# Patient Record
Sex: MALE | Race: WHITE | NOT HISPANIC OR LATINO | URBAN - METROPOLITAN AREA
[De-identification: names, ages, dates, MRNs, and addresses within clinical notes are randomized per-mention and may not be internally consistent; named-entity substitution may affect disease eponyms.]

---

## 2018-04-30 ENCOUNTER — OFFICE VISIT - HEALTHEAST (OUTPATIENT)
Dept: INTERNAL MEDICINE | Facility: CLINIC | Age: 33
End: 2018-04-30

## 2018-04-30 ENCOUNTER — COMMUNICATION - HEALTHEAST (OUTPATIENT)
Dept: TELEHEALTH | Facility: CLINIC | Age: 33
End: 2018-04-30

## 2018-04-30 DIAGNOSIS — N45.1 EPIDIDYMITIS: ICD-10-CM

## 2018-04-30 DIAGNOSIS — F43.23 ADJUSTMENT REACTION WITH ANXIETY AND DEPRESSION: ICD-10-CM

## 2018-04-30 DIAGNOSIS — R10.31 RIGHT INGUINAL PAIN: ICD-10-CM

## 2018-04-30 DIAGNOSIS — R10.31 CHRONIC GROIN PAIN, RIGHT: ICD-10-CM

## 2018-04-30 DIAGNOSIS — Z00.00 ENCOUNTER FOR GENERAL HEALTH EXAMINATION: ICD-10-CM

## 2018-04-30 DIAGNOSIS — G89.29 CHRONIC GROIN PAIN, RIGHT: ICD-10-CM

## 2018-04-30 LAB
ALBUMIN SERPL-MCNC: 4.3 G/DL (ref 3.5–5)
ALBUMIN UR-MCNC: NEGATIVE MG/DL
ALP SERPL-CCNC: 80 U/L (ref 45–120)
ALT SERPL W P-5'-P-CCNC: 18 U/L (ref 0–45)
ANION GAP SERPL CALCULATED.3IONS-SCNC: 8 MMOL/L (ref 5–18)
APPEARANCE UR: CLEAR
AST SERPL W P-5'-P-CCNC: 26 U/L (ref 0–40)
BACTERIA #/AREA URNS HPF: ABNORMAL HPF
BASOPHILS # BLD AUTO: 0 THOU/UL (ref 0–0.2)
BASOPHILS NFR BLD AUTO: 0 % (ref 0–2)
BILIRUB SERPL-MCNC: 0.9 MG/DL (ref 0–1)
BILIRUB UR QL STRIP: NEGATIVE
BUN SERPL-MCNC: 8 MG/DL (ref 8–22)
CALCIUM SERPL-MCNC: 9.5 MG/DL (ref 8.5–10.5)
CHLORIDE BLD-SCNC: 104 MMOL/L (ref 98–107)
CO2 SERPL-SCNC: 28 MMOL/L (ref 22–31)
COLOR UR AUTO: YELLOW
CREAT SERPL-MCNC: 0.8 MG/DL (ref 0.7–1.3)
EOSINOPHIL # BLD AUTO: 0.2 THOU/UL (ref 0–0.4)
EOSINOPHIL NFR BLD AUTO: 4 % (ref 0–6)
ERYTHROCYTE [DISTWIDTH] IN BLOOD BY AUTOMATED COUNT: 11.8 % (ref 11–14.5)
GFR SERPL CREATININE-BSD FRML MDRD: >60 ML/MIN/1.73M2
GLUCOSE BLD-MCNC: 82 MG/DL (ref 70–125)
GLUCOSE UR STRIP-MCNC: NEGATIVE MG/DL
HCT VFR BLD AUTO: 47 % (ref 40–54)
HGB BLD-MCNC: 16.3 G/DL (ref 14–18)
HGB UR QL STRIP: ABNORMAL
KETONES UR STRIP-MCNC: NEGATIVE MG/DL
LEUKOCYTE ESTERASE UR QL STRIP: NEGATIVE
LYMPHOCYTES # BLD AUTO: 1.9 THOU/UL (ref 0.8–4.4)
LYMPHOCYTES NFR BLD AUTO: 39 % (ref 20–40)
MCH RBC QN AUTO: 29.4 PG (ref 27–34)
MCHC RBC AUTO-ENTMCNC: 34.8 G/DL (ref 32–36)
MCV RBC AUTO: 84 FL (ref 80–100)
MONOCYTES # BLD AUTO: 0.3 THOU/UL (ref 0–0.9)
MONOCYTES NFR BLD AUTO: 6 % (ref 2–10)
NEUTROPHILS # BLD AUTO: 2.6 THOU/UL (ref 2–7.7)
NEUTROPHILS NFR BLD AUTO: 52 % (ref 50–70)
NITRATE UR QL: NEGATIVE
PH UR STRIP: 6.5 [PH] (ref 5–8)
PLATELET # BLD AUTO: 154 THOU/UL (ref 140–440)
PMV BLD AUTO: 7.9 FL (ref 7–10)
POTASSIUM BLD-SCNC: 4.3 MMOL/L (ref 3.5–5)
PROT SERPL-MCNC: 8 G/DL (ref 6–8)
RBC # BLD AUTO: 5.56 MILL/UL (ref 4.4–6.2)
RBC #/AREA URNS AUTO: ABNORMAL HPF
SODIUM SERPL-SCNC: 140 MMOL/L (ref 136–145)
SP GR UR STRIP: 1.01 (ref 1–1.03)
SQUAMOUS #/AREA URNS AUTO: ABNORMAL LPF
UROBILINOGEN UR STRIP-ACNC: ABNORMAL
WBC #/AREA URNS AUTO: ABNORMAL HPF
WBC: 5 THOU/UL (ref 4–11)

## 2018-04-30 ASSESSMENT — MIFFLIN-ST. JEOR: SCORE: 1704.09

## 2018-05-02 ENCOUNTER — COMMUNICATION - HEALTHEAST (OUTPATIENT)
Dept: INTERNAL MEDICINE | Facility: CLINIC | Age: 33
End: 2018-05-02

## 2018-05-08 ENCOUNTER — RECORDS - HEALTHEAST (OUTPATIENT)
Dept: ADMINISTRATIVE | Facility: OTHER | Age: 33
End: 2018-05-08

## 2018-05-15 ENCOUNTER — RECORDS - HEALTHEAST (OUTPATIENT)
Dept: ADMINISTRATIVE | Facility: OTHER | Age: 33
End: 2018-05-15

## 2018-05-17 ENCOUNTER — RECORDS - HEALTHEAST (OUTPATIENT)
Dept: ADMINISTRATIVE | Facility: OTHER | Age: 33
End: 2018-05-17

## 2018-05-18 ENCOUNTER — COMMUNICATION - HEALTHEAST (OUTPATIENT)
Dept: INTERNAL MEDICINE | Facility: CLINIC | Age: 33
End: 2018-05-18

## 2018-06-14 ENCOUNTER — OFFICE VISIT - HEALTHEAST (OUTPATIENT)
Dept: INTERNAL MEDICINE | Facility: CLINIC | Age: 33
End: 2018-06-14

## 2018-06-14 DIAGNOSIS — H72.90 PERFORATED EAR DRUM: ICD-10-CM

## 2018-06-14 DIAGNOSIS — F43.23 ADJUSTMENT REACTION WITH ANXIETY AND DEPRESSION: ICD-10-CM

## 2019-05-16 ENCOUNTER — COMMUNICATION - HEALTHEAST (OUTPATIENT)
Dept: INTERNAL MEDICINE | Facility: CLINIC | Age: 34
End: 2019-05-16

## 2019-06-10 ENCOUNTER — OFFICE VISIT - HEALTHEAST (OUTPATIENT)
Dept: INTERNAL MEDICINE | Facility: CLINIC | Age: 34
End: 2019-06-10

## 2019-06-10 DIAGNOSIS — F41.9 ANXIETY: ICD-10-CM

## 2019-06-10 DIAGNOSIS — Z00.00 ROUTINE GENERAL MEDICAL EXAMINATION AT A HEALTH CARE FACILITY: ICD-10-CM

## 2019-06-10 DIAGNOSIS — J45.990 EXERCISE-INDUCED ASTHMA: ICD-10-CM

## 2019-06-10 LAB
ALBUMIN SERPL-MCNC: 4.1 G/DL (ref 3.5–5)
ALP SERPL-CCNC: 63 U/L (ref 45–120)
ALT SERPL W P-5'-P-CCNC: 21 U/L (ref 0–45)
ANION GAP SERPL CALCULATED.3IONS-SCNC: 8 MMOL/L (ref 5–18)
AST SERPL W P-5'-P-CCNC: 24 U/L (ref 0–40)
BASOPHILS # BLD AUTO: 0 THOU/UL (ref 0–0.2)
BASOPHILS NFR BLD AUTO: 0 % (ref 0–2)
BILIRUB SERPL-MCNC: 0.5 MG/DL (ref 0–1)
BUN SERPL-MCNC: 9 MG/DL (ref 8–22)
CALCIUM SERPL-MCNC: 9.6 MG/DL (ref 8.5–10.5)
CHLORIDE BLD-SCNC: 106 MMOL/L (ref 98–107)
CO2 SERPL-SCNC: 25 MMOL/L (ref 22–31)
CREAT SERPL-MCNC: 0.77 MG/DL (ref 0.7–1.3)
EOSINOPHIL # BLD AUTO: 0.3 THOU/UL (ref 0–0.4)
EOSINOPHIL NFR BLD AUTO: 6 % (ref 0–6)
ERYTHROCYTE [DISTWIDTH] IN BLOOD BY AUTOMATED COUNT: 11.6 % (ref 11–14.5)
GFR SERPL CREATININE-BSD FRML MDRD: >60 ML/MIN/1.73M2
GLUCOSE BLD-MCNC: 89 MG/DL (ref 70–125)
HCT VFR BLD AUTO: 44.4 % (ref 40–54)
HGB BLD-MCNC: 15.1 G/DL (ref 14–18)
LYMPHOCYTES # BLD AUTO: 1.8 THOU/UL (ref 0.8–4.4)
LYMPHOCYTES NFR BLD AUTO: 34 % (ref 20–40)
MCH RBC QN AUTO: 29 PG (ref 27–34)
MCHC RBC AUTO-ENTMCNC: 33.9 G/DL (ref 32–36)
MCV RBC AUTO: 86 FL (ref 80–100)
MONOCYTES # BLD AUTO: 0.3 THOU/UL (ref 0–0.9)
MONOCYTES NFR BLD AUTO: 6 % (ref 2–10)
NEUTROPHILS # BLD AUTO: 2.9 THOU/UL (ref 2–7.7)
NEUTROPHILS NFR BLD AUTO: 55 % (ref 50–70)
PLATELET # BLD AUTO: 164 THOU/UL (ref 140–440)
PMV BLD AUTO: 7.4 FL (ref 7–10)
POTASSIUM BLD-SCNC: 4.2 MMOL/L (ref 3.5–5)
PROT SERPL-MCNC: 7.1 G/DL (ref 6–8)
RBC # BLD AUTO: 5.18 MILL/UL (ref 4.4–6.2)
SODIUM SERPL-SCNC: 139 MMOL/L (ref 136–145)
WBC: 5.3 THOU/UL (ref 4–11)

## 2019-06-10 RX ORDER — ALBUTEROL SULFATE 90 UG/1
2 AEROSOL, METERED RESPIRATORY (INHALATION) EVERY 6 HOURS PRN
Qty: 1 EACH | Refills: 12 | Status: SHIPPED | OUTPATIENT
Start: 2019-06-10

## 2019-06-10 ASSESSMENT — MIFFLIN-ST. JEOR: SCORE: 1732.78

## 2019-06-11 LAB
HEPATITIS B SURFACE ANTIBODY LHE- HISTORICAL: POSITIVE
RUBV IGG SERPL QL IA: POSITIVE

## 2019-06-12 ENCOUNTER — COMMUNICATION - HEALTHEAST (OUTPATIENT)
Dept: INTERNAL MEDICINE | Facility: CLINIC | Age: 34
End: 2019-06-12

## 2019-06-12 LAB
MEV IGG SER IA-ACNC: POSITIVE
MUV IGG SER QL IA: POSITIVE
VZV IGG SER QL IA: POSITIVE

## 2019-07-05 ENCOUNTER — COMMUNICATION - HEALTHEAST (OUTPATIENT)
Dept: INTERNAL MEDICINE | Facility: CLINIC | Age: 34
End: 2019-07-05

## 2019-08-06 ENCOUNTER — COMMUNICATION - HEALTHEAST (OUTPATIENT)
Dept: INTERNAL MEDICINE | Facility: CLINIC | Age: 34
End: 2019-08-06

## 2019-08-08 ENCOUNTER — COMMUNICATION - HEALTHEAST (OUTPATIENT)
Dept: INTERNAL MEDICINE | Facility: CLINIC | Age: 34
End: 2019-08-08

## 2019-09-04 ENCOUNTER — COMMUNICATION - HEALTHEAST (OUTPATIENT)
Dept: INTERNAL MEDICINE | Facility: CLINIC | Age: 34
End: 2019-09-04

## 2019-09-19 ENCOUNTER — COMMUNICATION - HEALTHEAST (OUTPATIENT)
Dept: INTERNAL MEDICINE | Facility: CLINIC | Age: 34
End: 2019-09-19

## 2019-11-19 ENCOUNTER — COMMUNICATION - HEALTHEAST (OUTPATIENT)
Dept: INTERNAL MEDICINE | Facility: CLINIC | Age: 34
End: 2019-11-19

## 2019-12-17 ENCOUNTER — COMMUNICATION - HEALTHEAST (OUTPATIENT)
Dept: INTERNAL MEDICINE | Facility: CLINIC | Age: 34
End: 2019-12-17

## 2020-01-22 ENCOUNTER — COMMUNICATION - HEALTHEAST (OUTPATIENT)
Dept: INTERNAL MEDICINE | Facility: CLINIC | Age: 35
End: 2020-01-22

## 2020-03-24 ENCOUNTER — COMMUNICATION - HEALTHEAST (OUTPATIENT)
Dept: INTERNAL MEDICINE | Facility: CLINIC | Age: 35
End: 2020-03-24

## 2020-10-16 ENCOUNTER — COMMUNICATION - HEALTHEAST (OUTPATIENT)
Dept: INTERNAL MEDICINE | Facility: CLINIC | Age: 35
End: 2020-10-16

## 2020-10-16 DIAGNOSIS — F41.9 ANXIETY: ICD-10-CM

## 2020-10-18 RX ORDER — ESCITALOPRAM OXALATE 20 MG/1
20 TABLET ORAL DAILY
Qty: 90 TABLET | Refills: 3 | Status: SHIPPED | OUTPATIENT
Start: 2020-10-18

## 2021-02-12 ENCOUNTER — COMMUNICATION - HEALTHEAST (OUTPATIENT)
Dept: INTERNAL MEDICINE | Facility: CLINIC | Age: 36
End: 2021-02-12

## 2021-02-12 DIAGNOSIS — Z11.1 ENCOUNTER FOR TUBERCULIN SKIN TEST: ICD-10-CM

## 2021-02-15 ENCOUNTER — AMBULATORY - HEALTHEAST (OUTPATIENT)
Dept: NURSING | Facility: CLINIC | Age: 36
End: 2021-02-15

## 2021-02-15 DIAGNOSIS — Z11.1 ENCOUNTER FOR TUBERCULIN SKIN TEST: ICD-10-CM

## 2021-02-17 ENCOUNTER — COMMUNICATION - HEALTHEAST (OUTPATIENT)
Dept: FAMILY MEDICINE | Facility: CLINIC | Age: 36
End: 2021-02-17

## 2021-02-17 ENCOUNTER — AMBULATORY - HEALTHEAST (OUTPATIENT)
Dept: NURSING | Facility: CLINIC | Age: 36
End: 2021-02-17

## 2021-02-17 DIAGNOSIS — Z11.1 ENCOUNTER FOR TUBERCULIN SKIN TEST: ICD-10-CM

## 2021-02-17 DIAGNOSIS — Z11.1 ENCOUNTER FOR READING OF TUBERCULIN SKIN TEST: ICD-10-CM

## 2021-02-17 LAB — TB SKIN TEST - HISTORICAL: NEGATIVE

## 2021-02-23 ENCOUNTER — AMBULATORY - HEALTHEAST (OUTPATIENT)
Dept: NURSING | Facility: CLINIC | Age: 36
End: 2021-02-23

## 2021-02-23 DIAGNOSIS — Z11.1 ENCOUNTER FOR TUBERCULIN SKIN TEST: ICD-10-CM

## 2021-02-25 ENCOUNTER — AMBULATORY - HEALTHEAST (OUTPATIENT)
Dept: NURSING | Facility: CLINIC | Age: 36
End: 2021-02-25

## 2021-02-25 DIAGNOSIS — Z11.1 ENCOUNTER FOR READING OF TUBERCULIN SKIN TEST: ICD-10-CM

## 2021-02-25 LAB
INDURATION - HISTORICAL: 0 MM
TB SKIN TEST - HISTORICAL: NEGATIVE

## 2021-05-28 ASSESSMENT — ASTHMA QUESTIONNAIRES: ACT_TOTALSCORE: 20

## 2021-05-29 NOTE — PATIENT INSTRUCTIONS - HE
1. Continue escitalopram    2. Use albuterol inhaler prn for exercise induced asthma    3. Lab testing today.

## 2021-05-29 NOTE — PROGRESS NOTES
ASSESSMENT/PLAN:  1. Routine general medical examination at a health care facility  He needs confirmation of immunity for school this fall.  May need hep B immunization.   - Rubella Antibody, IgG  - Rubeola Antibody, IgG  - Mumps Antibody, IgG  - Varicella Zoster Antibody, IgG  - Hepatitis B Surface Antibody (Anti-HBs)  - HM1(CBC and Differential)  - Comprehensive Metabolic Panel    2. Exercise-induced asthma  Mild, can use albuterol inhaler prn.   - albuterol (PROAIR HFA;PROVENTIL HFA;VENTOLIN HFA) 90 mcg/actuation inhaler; Inhale 2 puffs every 6 (six) hours as needed for wheezing (and with exercise).  Dispense: 1 each; Refill: 12    3. Adjustment reaction with anxiety and depression  Much improved with escitalopram.  Willl continue.   - escitalopram oxalate (LEXAPRO) 20 MG tablet; Take 1 tablet (20 mg total) by mouth daily.  Dispense: 30 tablet; Refill: 11    Patient Instructions   1. Continue escitalopram    2. Use albuterol inhaler prn for exercise induced asthma    3. Lab testing today.        CHIEF COMPLAINT:  Chief Complaint   Patient presents with     Annual Exam     Paperwork     school paperwork-- [employer] for school      Other     would like a prescription for an inhaler      HISTORY OF PRESENT ILLNESS:  Isai is a 33 y.o. male presenting to the clinic today for general health evaluation.  Has been doing well with escitalopram.  No significant side effects.  Planning to attend school in the fall.  Needs immunization confirmation. Tolerating exercise well.     REVIEW OF SYSTEMS:   Constitutional: no fever, chills, or sweats  Respiratory: No wheezes, cough, shortness of breath  Cardiovascular: No chest pain or palpitations  Gastrointestinal: No nausea, vomiting, diarrhea, dyspepsia, or pain  Psychiatric: see HPI   All other systems on reveiw are negative.    PFSH:  Social History     Tobacco Use   Smoking Status Never Smoker   Smokeless Tobacco Never Used     Family History   Problem Relation Age of Onset      "Testicular cancer Father      COPD Father      Throat cancer Maternal Grandfather      Social History     Socioeconomic History     Marital status:      Spouse name: Not on file     Number of children: 0     Years of education: Not on file     Highest education level: Not on file   Occupational History     Occupation: Unemployed   Social Needs     Financial resource strain: Not on file     Food insecurity:     Worry: Not on file     Inability: Not on file     Transportation needs:     Medical: Not on file     Non-medical: Not on file   Tobacco Use     Smoking status: Never Smoker     Smokeless tobacco: Never Used   Substance and Sexual Activity     Alcohol use: Not on file     Drug use: Not on file     Sexual activity: Not on file   Lifestyle     Physical activity:     Days per week: Not on file     Minutes per session: Not on file     Stress: Not on file   Relationships     Social connections:     Talks on phone: Not on file     Gets together: Not on file     Attends Jainism service: Not on file     Active member of club or organization: Not on file     Attends meetings of clubs or organizations: Not on file     Relationship status: Not on file     Intimate partner violence:     Fear of current or ex partner: Not on file     Emotionally abused: Not on file     Physically abused: Not on file     Forced sexual activity: Not on file   Other Topics Concern     Not on file   Social History Narrative    Marketing degree      Past Surgical History:   Procedure Laterality Date     HERNIA REPAIR       No Known Allergies  Past Medical History:   Diagnosis Date     Asthma in adult, mild intermittent, uncomplicated     exercise induced, cold air     Exercise-induced asthma 6/10/2019     Seasonal allergic rhinitis      VITALS:  Vitals:    06/10/19 1518   BP: 110/78   Patient Site: Left Arm   Patient Position: Sitting   Cuff Size: Adult Large   Pulse: 64   Resp: 16   Weight: 185 lb (83.9 kg)   Height: 5' 7\" (1.702 m) " "    Wt Readings from Last 3 Encounters:   06/10/19 185 lb (83.9 kg)   06/14/18 177 lb 5 oz (80.4 kg)   04/30/18 177 lb 12.8 oz (80.6 kg)     Body mass index is 28.98 kg/m .  PHYSICAL EXAM:  General Appearance: In no acute distress  /78 (Patient Site: Left Arm, Patient Position: Sitting, Cuff Size: Adult Large)   Pulse 64   Resp 16   Ht 5' 7\" (1.702 m)   Wt 185 lb (83.9 kg)   BMI 28.98 kg/m    EYES: Clear, without inflammation, fundi are unremarkable, discs flat   HEENT: Without congestion or inflammation  NECK:  without cervical or axillary adenopathy, thyroid normal  RESPIRATORY: Clear to auscultation  CARDIOVASCULAR: S1, S2, without murmur   ABDOMEN: soft, flat, and non-tender, without mass, rebound, or guarding  RECTAL: deferred  GENITOURINARY: normal testes and phallus  EXTREMITIES: No joint swelling, no ulcer or edema  NEUROLOGIC: Non-focal, no arm or leg  weakness, speech is clear, gait is normal  PSYCHIATRIC: Oriented X 3, without confusion, behavior and affect normal, thinking is clear    Current Outpatient Medications   Medication Sig Dispense Refill     escitalopram oxalate (LEXAPRO) 20 MG tablet Take 1 tablet (20 mg total) by mouth daily. 30 tablet 11     albuterol (PROAIR HFA;PROVENTIL HFA;VENTOLIN HFA) 90 mcg/actuation inhaler Inhale 2 puffs every 6 (six) hours as needed for wheezing (and with exercise). 1 each 12     No current facility-administered medications for this visit.      "

## 2021-05-31 NOTE — TELEPHONE ENCOUNTER
2:00: Dr Shaver was in his office and completed the form. Aminata very appreciative.    Pt's wife dropped off form for an ins discount.  Dr Rodriguez needs to sign and mail it to pt's home.    Put in 's in basket in the back of the [place].

## 2021-06-01 VITALS — BODY MASS INDEX: 27.56 KG/M2 | WEIGHT: 177.31 LBS

## 2021-06-01 VITALS — HEIGHT: 67 IN | BODY MASS INDEX: 27.91 KG/M2 | WEIGHT: 177.8 LBS

## 2021-06-03 VITALS — WEIGHT: 185 LBS | BODY MASS INDEX: 29.03 KG/M2 | HEIGHT: 67 IN

## 2021-06-04 NOTE — TELEPHONE ENCOUNTER
LMTCB  CC Please go over questions below with pt          Asthma Control Test    1. In the past 4 weeks, how much of the time did your asthma keep you from getting as much done at work, school or at home?    All of the time (1)  Most of the time (2)  Some of the time (3)  Alittle of the time (4)   Not at all (5)    2. During the past 4 weeks, how often have you had shortness of breath?      More than once a day ( 1)  Once a day (2)  3-6 times a week (3)   Once or twice a week (4)  Not at all (5)    3.During the past 4 weeks, how often did your asthma symptoms (wheezing, coughing, shortness of breath, chest tightness or pain) wake you up at night or earlier then usual in the morning?     4 or more nights a week (1)  2 to 3 nights a week (2)  Once a week (3)  Once or twice (4)  Not at all (5)      4. During the past 4 weeks, how often have you used your rescue inhaler or nebulizer medication(such as albuterol)?    3 or more times per day (1)  1 to 2 times per day (2)  2 or 3 times per week (3)  Once a week or less (4)  Not at all (5)      5. How would you rate your asthma control over the past 4 weeks?    Not controlled at all (1)  Poorly controlled (2)  Somewhat controlled (3)   Well controlled (4)   Completely controlled (5)       -In the past 12 months, How many emergency department visits have you had due to asthma ( That did not result in hospitalization)?    -In the past 12 months, how many inpatients hospitalizations have you had due to asthma?    Have you been using your inhaler? If yes, how often?        Please answer these questions by example below    1. 5  2. 5  3. 5  4. 5  5. 5    No or yes    No or yes

## 2021-06-15 NOTE — PROGRESS NOTES
Isai came in today for a PPD placement, see immunizations.  He will come back on 2/17 after 4:15 to have it read    Kirsten Cartagena CMA (AAMA)

## 2021-06-15 NOTE — TELEPHONE ENCOUNTER
Left message to call back for:  Isai  Information to relay to patient:  Order for mantoux is placed.  Please set up nurse only visit.

## 2021-06-15 NOTE — TELEPHONE ENCOUNTER
Pt needs a two step mantoux completed. He is coming in next week for his second mantoux. Please sign pended orders.

## 2021-06-15 NOTE — TELEPHONE ENCOUNTER
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

## 2021-06-16 PROBLEM — G89.29 CHRONIC GROIN PAIN, RIGHT: Status: ACTIVE | Noted: 2018-04-30

## 2021-06-16 PROBLEM — N45.1 EPIDIDYMITIS: Status: ACTIVE | Noted: 2018-04-30

## 2021-06-16 PROBLEM — F43.23 ADJUSTMENT REACTION WITH ANXIETY AND DEPRESSION: Status: ACTIVE | Noted: 2018-04-30

## 2021-06-16 PROBLEM — J45.990 EXERCISE-INDUCED ASTHMA: Status: ACTIVE | Noted: 2019-06-10

## 2021-06-16 PROBLEM — R10.31 CHRONIC GROIN PAIN, RIGHT: Status: ACTIVE | Noted: 2018-04-30

## 2021-06-17 NOTE — PROGRESS NOTES
ASSESSMENT/PLAN:    1. Epididymitis  Exam on the right, with tenderness, will treat empirically with doxycycline 100mg po bid for 10 days.   - Urinalysis-UC if Indicated  - HM1(CBC and Differential)  - HM1 (CBC with Diff)    2. Encounter for general health examination  His exam is negative for acute or chronic general health issues.  Discussed healthy life style and diet.    - Comprehensive Metabolic Panel    3. Right inguinal pain  Chronic, exam  - Ambulatory referral to General Surgery    4. Adjustment reaction with anxiety and depression   History is consistent with mild depression disorder with anxiety and occasional panic.  No self harm ideation.  He agrees to trial of escitalopram 20mg po daily at , follow up in 6 weeks, referral to psychology for assessment for counseling.   - Ambulatory referral to Psychology    CHIEF COMPLAINT:  Chief Complaint   Patient presents with     [facility]     Annual Exam     HISTORY OF PRESENT ILLNESS:  Isai is a 32 y.o. male presenting to the clinic today with irritability, occasional episode of anxiety and panic. Recently moved to this area.  Unemployed presently. Is .  No children.  Denies excess use of alcohol or any drug use, or cigarette smoking.  Has not been ill or had fever, or chills, or unusual cough, or nausea.  There is a chronic right groin pain, without clear exacerbating factors.  History of herniorrhaphy on that side in child watt.  Also a tenderness right testicle.  No known trauma, or dysuria or discharge.       REVIEW OF SYSTEMS:   Constitutional: no fever, chills, or sweats  Respiratory: No wheezes, cough, shortness of breath  Cardiovascular: No chest pain or palpitations  Gastrointestinal: No nausea, vomiting, diarrhea, dyspepsia, or pain  Genitourinary: see HPI  All other systems on reveiw are negative.  PFSH:    History   Smoking Status     Never Smoker   Smokeless Tobacco     Never Used     Family History   Problem Relation Age of Onset      "Testicular cancer Father      COPD Father      Throat cancer Maternal Grandfather      Social History     Social History     Marital status:      Spouse name: N/A     Number of children: 0     Years of education: N/A     Occupational History     Unemployed      Social History Main Topics     Smoking status: Never Smoker     Smokeless tobacco: Never Used     Alcohol use Not on file     Drug use: Not on file     Sexual activity: Not on file     Other Topics Concern     Not on file     Social History Narrative    Marketing degree        Past Surgical History:   Procedure Laterality Date     HERNIA REPAIR       No Known Allergies    Active Ambulatory Problems     Diagnosis Date Noted     Chronic groin pain, right 04/30/2018     Epididymitis 04/30/2018     Adjustment reaction with anxiety and depression 04/30/2018     Resolved Ambulatory Problems     Diagnosis Date Noted     No Resolved Ambulatory Problems     Past Medical History:   Diagnosis Date     Asthma in adult, mild intermittent, uncomplicated      Seasonal allergic rhinitis      VITALS:  Vitals:    04/30/18 1057   BP: 124/76   Patient Site: Left Arm   Patient Position: Sitting   Cuff Size: Adult Regular   Pulse: 72   Weight: 177 lb 12.8 oz (80.6 kg)   Height: 5' 7.25\" (1.708 m)     Wt Readings from Last 3 Encounters:   04/30/18 177 lb 12.8 oz (80.6 kg)     Body mass index is 27.64 kg/(m^2).    PHYSICAL EXAM:  General Appearance: In no acute distress  /76 (Patient Site: Left Arm, Patient Position: Sitting, Cuff Size: Adult Regular)  Pulse 72  Ht 5' 7.25\" (1.708 m)  Wt 177 lb 12.8 oz (80.6 kg)  BMI 27.64 kg/m2  EYES: Clear, without inflammation   HEENT: Without congestion or inflammation  NECK:  supple, without adenopathy or thryroid enlargement  CHEST: no focal tenderness   BREAST: deferred  RESPIRATORY: Clear to auscultation, no wheezes or rales  CARDIOVASCULAR: S1, S2, without murmur, rub, or gallop   ABDOMEN: soft, flat, and non-tender, " without hepatosplenomegaly, mass, rebound, or guarding  RECTAL: deferred  GENITOURINARY: normal penis and testes, right tenderness of the epididymis, without fluctuance, or redness.   MUSCULOSKELETAL: No joint swelling, or inflammation  PERIPHERAL PULSES:  full, no edema  NEUROLOGIC: Non-focal, no arm or leg  weakness, speech is clear  PSYCHIATRIC: Oriented X 3, without confusion, behavior and affect normal    ADDITIONAL HISTORY SUMMARIZED (2): None.  DECISION TO OBTAIN EXTRA INFORMATION (1): None.   RADIOLOGY TESTS (1): None.  LABS (1): None.  MEDICINE TESTS (1): None.  INDEPENDENT REVIEW (2 each): None.     Current Outpatient Prescriptions   Medication Sig Dispense Refill     doxycycline (VIBRA-TABS) 100 MG tablet Take 1 tablet (100 mg total) by mouth 2 (two) times a day for 10 days. 20 tablet 0     escitalopram oxalate (LEXAPRO) 20 MG tablet Take 1 tablet (20 mg total) by mouth daily. 30 tablet 2     No current facility-administered medications for this visit.
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
## 2021-06-19 NOTE — LETTER
Letter by Sanya Rodriguez MD at      Author: Sanya Rodriguez MD Service: -- Author Type: --    Filed:  Encounter Date: 9/19/2019 Status: (Other)       Hello-  Your provider is wanting to know how your breathing has been. Please fill out the questions below, and send it back to the clinic. We will send the results to your provider. If any changes are needed, we will notify you. Thank you.      Asthma Control Test    1. In the past 4 weeks, how much of the time did your asthma keep you from getting as much done at work, school or at home?    All of the time (1)  Most of the time (2)  Some of the time (3)  Alittle of the time (4)   Not at all (5)    2. During the past 4 weeks, how often have you had shortness of breath?      More than once a day ( 1)  Once a day (2)  3-6 times a week (3)   Once or twice a week (4)  Not at all (5)    3.During the past 4 weeks, how often did your asthma symptoms (wheezing, coughing, shortness of breath, chest tightness or pain) wake you up at night or earlier then usual in the morning?     4 or more nights a week (1)  2 to 3 nights a week (2)  Once a week (3)  Once or twice (4)  Not at all (5)      4. During the past 4 weeks, how often have you used your rescue inhaler or nebulizer medication(such as albuterol)?    3 or more times per day (1)  1 to 2 times per day (2)  2 or 3 times per week (3)  Once a week or less (4)  Not at all (5)      5. How would you rate your asthma control over the past 4 weeks?    Not controlled at all (1)  Poorly controlled (2)  Somewhat controlled (3)   Well controlled (4)   Completely controlled (5)       -In the past 12 months, How many emergency department visits have you had due to asthma ( That did not result in hospitalization)?    -In the past 12 months, how many inpatients hospitalizations have you had due to asthma?    Have you been using your inhaler? If yes, how often?        Please answer these questions by example below    1. 5  2. 5  3. 5  4.  5  5. 5    No or yes    No or yes

## 2021-06-19 NOTE — LETTER
Letter by Sanya Rodriguez MD at      Author: Sanya Rodriguez MD Service: -- Author Type: --    Filed:  Encounter Date: 6/12/2019 Status: (Other)         Isai Blas  [address] [place] [address]       June 12, 2019       Dear Mr. Blas,    Below are the results from your recent visit:    Resulted Orders   Rubella Antibody, IgG   Result Value Ref Range    Rubella Antibody, IgG Positive     Narrative    Negative: Absence of detectable rubella virus IgG antibodies. A negative result presumes that immunity has not been acquired.   Equivocal: Suggest recollection.  Positive: Considered positive for IgG antibodies to rubella virus.   Rubeola Antibody, IgG   Result Value Ref Range    Rubeola Antibody, IgG Positive     Narrative    Negative: Absence of detectable measles virus IgG antibodies. A negative result generally indicates that the patient has not been infected and is susceptible to measles.   Equivocal: Suggest recollection no less than one to two weeks later.  Positive: Presence of detectable measles virus IgG antibodies. A positive result generally indicates exposure to measles virus or previous vaccination.   Mumps Antibody, IgG   Result Value Ref Range    Mumps Antibody, IgG Positive     Narrative    Negative: Absence of detectable mumps virus IgG antibodies. A negative result generally indicates that the patient has not been infected and is susceptible to mumps.   Equivocal: Suggest recollection no less than one to two weeks later.  Positive: Presence of detectable mumps virus IgG antibodies. A positive result generally indicates past exposure to mumps virus or previous vaccination.   Varicella Zoster Antibody, IgG   Result Value Ref Range    Varicella Zoster Antibody IgG Positive     Narrative    Assay interference due to circulating antibodies against HIV, Hepatitis A, Hepatitis B, Hepatitis C, HAMA and Rheumatoid Factor has not been evaluated.  The assay performance in detecting antibodies to  Varicella Zoster in individuals vaccinated with the FDA-licensed VZV vaccine has not been established.  Negative: Absence of detectable Varicella Zoster IgG antibodies. A negative result indicates no detectable VZV antibody, but does not rule out acute infection. It should be noted that the test usually scores negative in infected patients during the incubation period and the early stages of infection.  Equivocal: Suggest recollection.  Positive: Presence of detectable Varicella Zoster IgG antibodies. A positive result generally indicates exposure to the pathogen or administration of specific immune-globulins, but it is no indication of active infection or stage of disease.   Hepatitis B Surface Antibody (Anti-HBs)   Result Value Ref Range    Hep B Surface Antibody Positive (!) Negative   Comprehensive Metabolic Panel   Result Value Ref Range    Sodium 139 136 - 145 mmol/L    Potassium 4.2 3.5 - 5.0 mmol/L    Chloride 106 98 - 107 mmol/L    CO2 25 22 - 31 mmol/L    Anion Gap, Calculation 8 5 - 18 mmol/L    Glucose 89 70 - 125 mg/dL    BUN 9 8 - 22 mg/dL    Creatinine 0.77 0.70 - 1.30 mg/dL    GFR MDRD Af Amer >60 >60 mL/min/1.73m2    GFR MDRD Non Af Amer >60 >60 mL/min/1.73m2    Bilirubin, Total 0.5 0.0 - 1.0 mg/dL    Calcium 9.6 8.5 - 10.5 mg/dL    Protein, Total 7.1 6.0 - 8.0 g/dL    Albumin 4.1 3.5 - 5.0 g/dL    Alkaline Phosphatase 63 45 - 120 U/L    AST 24 0 - 40 U/L    ALT 21 0 - 45 U/L    Narrative    Fasting Glucose reference range is 70-99 mg/dL per  American Diabetes Association (ADA) guidelines.   HM1 (CBC with Diff)   Result Value Ref Range    WBC 5.3 4.0 - 11.0 thou/uL    RBC 5.18 4.40 - 6.20 mill/uL    Hemoglobin 15.1 14.0 - 18.0 g/dL    Hematocrit 44.4 40.0 - 54.0 %    MCV 86 80 - 100 fL    MCH 29.0 27.0 - 34.0 pg    MCHC 33.9 32.0 - 36.0 g/dL    RDW 11.6 11.0 - 14.5 %    Platelets 164 140 - 440 thou/uL    MPV 7.4 7.0 - 10.0 fL    Neutrophils % 55 50 - 70 %    Lymphocytes % 34 20 - 40 %    Monocytes %  6 2 - 10 %    Eosinophils % 6 0 - 6 %    Basophils % 0 0 - 2 %    Neutrophils Absolute 2.9 2.0 - 7.7 thou/uL    Lymphocytes Absolute 1.8 0.8 - 4.4 thou/uL    Monocytes Absolute 0.3 0.0 - 0.9 thou/uL    Eosinophils Absolute 0.3 0.0 - 0.4 thou/uL    Basophils Absolute 0.0 0.0 - 0.2 thou/uL     Your tests are all good.  You don't need any immunizations, including for hepatitis B.  You have the antibodies.      Please call with questions or contact us using [facility].    Sincerely,        Electronically signed by Sanya Rodriguez MD

## 2021-06-21 NOTE — LETTER
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 The results of this test are as follows:    TB Skin Test (no units)   Date Value   02/25/2021 Negative     This test was negative for tuberculosis exposure per current Centers for Disease Control guidelines.    A chest x-ray was not required.    If you have any questions or concerns, please don't hesitate to call.    Sincerely,        Electronically signed by Clinical Support Staff

## 2021-06-21 NOTE — LETTER
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 The results of this test are as follows:    TB Skin Test (no units)   Date Value   02/17/2021 Negative     This test was negative for tuberculosis exposure per current Centers for Disease Control guidelines.    A chest x-ray was not required.    If you have any questions or concerns, please don't hesitate to call.    Sincerely,        Electronically signed by Clinical Support Staff

## 2021-08-15 ENCOUNTER — HEALTH MAINTENANCE LETTER (OUTPATIENT)
Age: 36
End: 2021-08-15

## 2021-10-11 ENCOUNTER — HEALTH MAINTENANCE LETTER (OUTPATIENT)
Age: 36
End: 2021-10-11

## 2022-09-25 ENCOUNTER — HEALTH MAINTENANCE LETTER (OUTPATIENT)
Age: 37
End: 2022-09-25

## 2023-10-14 ENCOUNTER — HEALTH MAINTENANCE LETTER (OUTPATIENT)
Age: 38
End: 2023-10-14